# Patient Record
Sex: FEMALE | Race: WHITE | Employment: FULL TIME
[De-identification: names, ages, dates, MRNs, and addresses within clinical notes are randomized per-mention and may not be internally consistent; named-entity substitution may affect disease eponyms.]

---

## 2017-06-09 ENCOUNTER — HEALTH MAINTENANCE LETTER (OUTPATIENT)
Age: 52
End: 2017-06-09

## 2017-06-21 ENCOUNTER — TRANSFERRED RECORDS (OUTPATIENT)
Dept: FAMILY MEDICINE | Facility: CLINIC | Age: 52
End: 2017-06-21

## 2017-06-23 ENCOUNTER — TELEPHONE (OUTPATIENT)
Dept: FAMILY MEDICINE | Facility: CLINIC | Age: 52
End: 2017-06-23

## 2017-06-23 NOTE — TELEPHONE ENCOUNTER
Pt called to make sure we have received her Colonoscopy report.   She has an upcoming appointment.   JUAN FRANCISCO Agosto ([facility])

## 2017-06-27 ENCOUNTER — OFFICE VISIT (OUTPATIENT)
Dept: FAMILY MEDICINE | Facility: CLINIC | Age: 52
End: 2017-06-27

## 2017-06-27 VITALS
BODY MASS INDEX: 32.09 KG/M2 | WEIGHT: 192.6 LBS | OXYGEN SATURATION: 96 % | RESPIRATION RATE: 12 BRPM | HEIGHT: 65 IN | HEART RATE: 77 BPM | SYSTOLIC BLOOD PRESSURE: 148 MMHG | TEMPERATURE: 98.3 F | DIASTOLIC BLOOD PRESSURE: 92 MMHG

## 2017-06-27 DIAGNOSIS — I10 ESSENTIAL HYPERTENSION, BENIGN: Primary | ICD-10-CM

## 2017-06-27 PROCEDURE — 80053 COMPREHEN METABOLIC PANEL: CPT | Mod: 90 | Performed by: FAMILY MEDICINE

## 2017-06-27 PROCEDURE — 99213 OFFICE O/P EST LOW 20 MIN: CPT | Performed by: FAMILY MEDICINE

## 2017-06-27 PROCEDURE — 36415 COLL VENOUS BLD VENIPUNCTURE: CPT | Performed by: FAMILY MEDICINE

## 2017-06-27 RX ORDER — LISINOPRIL 10 MG/1
10 TABLET ORAL DAILY
Qty: 30 TABLET | Refills: 1 | Status: SHIPPED | OUTPATIENT
Start: 2017-06-27 | End: 2017-07-31

## 2017-06-27 NOTE — MR AVS SNAPSHOT
After Visit Summary   6/27/2017    Lori M Seavers    MRN: [id number]           Patient Information     Date Of Birth          1965        Visit Information        Provider Department      6/27/2017 6:15 PM Zoe Chatterjee MD [facility]        Today's Diagnoses     Essential hypertension, benign    -  1      Care Instructions    Plan:  1)  Medication: begin: lisinopril  2)  Dietary sodium restriction  3)  Regular aerobic exercise  4)  Recheck in 3 weeks, sooner should new symptoms or   problems arise.    Patient Education: Reviewed risks of hypertension and principles of   treatment.            Follow-ups after your visit        Follow-up notes from your care team     Return in about 3 weeks (around 7/18/2017).      Who to contact     If you have questions or need follow up information about today's clinic visit or your schedule please contact [facility] directly at [phone].  Normal or non-critical lab and imaging results will be communicated to you by MyChart, letter or phone within 4 business days after the clinic has received the results. If you do not hear from us within 7 days, please contact the clinic through In[employer] or phone. If you have a critical or abnormal lab result, we will notify you by phone as soon as possible.  Submit refill requests through [employer] or call your pharmacy and they will forward the refill request to us. Please allow 3 business days for your refill to be completed.          Additional Information About Your Visit        MyChart Information     [employer] gives you secure access to your electronic health record. If you see a primary care provider, you can also send messages to your care team and make appointments. If you have questions, please call your primary care clinic.  If you do not have a primary care provider, please call [phone] and they will assist you.        Care EveryWhere ID     This is your Care  "EveryWhere ID. This could be used by other organizations to access your [facility] medical records  [id number]        Your Vitals Were     Pulse Temperature Respirations Height Last Period Pulse Oximetry    77 98.3  F (36.8  C) (Oral) 12 1.657 m (5' 5.25\") 05/25/2017 96%    Breastfeeding? BMI (Body Mass Index)                No 31.81 kg/m2           Blood Pressure from Last 3 Encounters:   06/27/17 (!) 148/92   10/06/16 128/82   04/04/16 138/88    Weight from Last 3 Encounters:   06/27/17 87.4 kg (192 lb 9.6 oz)   10/06/16 86.7 kg (191 lb 3.2 oz)   04/04/16 89.8 kg (198 lb)              We Performed the Following     COMPREHENSIVE METABOLIC PANEL (QUEST) XCMP     VENOUS COLLECTION          Today's Medication Changes          These changes are accurate as of: 6/27/17  7:29 PM.  If you have any questions, ask your nurse or doctor.               Start taking these medicines.        Dose/Directions    lisinopril 10 MG tablet   Commonly known as:  PRINIVIL/ZESTRIL   Used for:  Essential hypertension, benign   Started by:  Zoe Chatterjee MD        Dose:  10 mg   Take 1 tablet (10 mg) by mouth daily   Quantity:  30 tablet   Refills:  1            Where to get your medicines      [facility] [address] [place] [address] - [address] [facility] [address] [facility]  [address], [place] [address]     Phone:  [phone]     lisinopril 10 MG tablet                Primary Care Provider Office Phone # Fax #    Zoe Chatterjee [phone]       [place] [address] [facility]  [address]  [facility] [address]        Equal Access to Services     BALTAZAR ROLAND AH: Lopez Cisse, aaron keen, qaberta kaalmada adekarthikeyan, anh emanuel. [facility] [phone].    ATENCIÓN: Si habla español, tiene a abdul disposición servicios gratuitos de asistencia lingüística. Llame al [phone].    We comply with applicable federal civil rights " laws and [place] laws. We do not discriminate on the basis of race, color, national origin, age, disability sex, sexual orientation or gender identity.            Thank you!     Thank you for choosing [facility]  for your care. Our goal is always to provide you with excellent care. Hearing back from our patients is one way we can continue to improve our services. Please take a few minutes to complete the written survey that you may receive in the mail after your visit with us. Thank you!             Your Updated Medication List - Protect others around you: Learn how to safely use, store and throw away your medicines at www.disposemymeds.org.          This list is accurate as of: 6/27/17  7:29 PM.  Always use your most recent med list.                   Brand Name Dispense Instructions for use Diagnosis    fexofenadine 60 MG tablet    ALLEGRA    180 tablet    Take 1 tablet by mouth 2 times daily.    Allergic rhinitis, cause unspecified       fluticasone 50 MCG/ACT spray    FLONASE    3 Bottle    USE ONE OR TWO SPRAYS IN EACH NOSTRIL DAILY    Non-seasonal allergic rhinitis due to pollen       lisinopril 10 MG tablet    PRINIVIL/ZESTRIL    30 tablet    Take 1 tablet (10 mg) by mouth daily    Essential hypertension, benign

## 2017-06-27 NOTE — PROGRESS NOTES
"  SUBJECTIVE:  Lori M Seavers is an 51 year old female who presents for evaluation of   hypertension. She indicates that she is feeling well and   denies any symptoms referable to her elevated blood pressure.   Specifically denies chest pain, palpitations, dyspnea, orthopnea,   PND or peripheral edema. Current medication regimen is as listed   below. Patient denies any side effects of medication.    Family history: positive for hypertension and diabetes mellitus  Age at onset of elevated blood pressure: 50  Cardiovascular risk factors: family history, hypertension, obesity, sedentary life style and stress  Use of agents associated with hypertension: none  History of renal disease: negative  History of flank trauma: negative    Current Outpatient Prescriptions   Medication     fluticasone (FLONASE) 50 MCG/ACT spray     fexofenadine (ALLEGRA) 60 MG tablet     No current facility-administered medications for this visit.      Allergies   Allergen Reactions     Amoxicillin      hives     Sulfamethoxazole W/Trimethoprim      hives       Social History   Substance Use Topics     Smoking status: Never Smoker     Smokeless tobacco: Never Used     Alcohol use 0.5 oz/week     1 Standard drinks or equivalent per week       OBJECTIVE:  BP (!) 148/92 (BP Location: Right arm, Patient Position: Chair, Cuff Size: Adult Regular)  Pulse 77  Temp 98.3  F (36.8  C) (Oral)  Resp 12  Ht 1.657 m (5' 5.25\")  Wt 87.4 kg (192 lb 9.6 oz)  LMP 05/25/2017  SpO2 96%  Breastfeeding? No  BMI 31.81 kg/m2  Repeat BP R arm seated = 148/92  with regular size cuff.  Fundi: deferred  Thyroid: normal to inspection and palpation  Lungs: negative, Percussion normal. Good diaphragmatic excursion. Lungs clear  Heart: negative, PMI normal. No lifts, heaves, or thrills. RRR. No murmurs, clicks gallops or rub  Peripheral pulses: radial=4/4, femoral=4/4, popliteal=4/4, dorsalis pedis=4/4,  Abd; The abdomen is soft without tenderness, guarding, mass or " organomegaly. Bowel sounds are normal. No CVA tenderness or inguinal adenopathy noted.  BMI : Body mass index is 31.81 kg/(m^2).    ASSESSMENT:  Essential hypertension    Plan:  1)  Medication: begin: lisinopril  2)  Dietary sodium restriction  3)  Regular aerobic exercise  4)  Recheck in 3 weeks, sooner should new symptoms or   problems arise.    Patient Education: Reviewed risks of hypertension and principles of   treatment.

## 2017-06-27 NOTE — NURSING NOTE
Mimi is here for hypertension- to discuss blood pressure.     Pre-Visit Screening :  Immunizations : up to date    Colonoscopy : is up to date  Mammogram : is up to date  Asthma Action Test/Plan : NA  PHQ9/GAD7 :  NA      Pulse - regular  My Chart - accepts    CLASSIFICATION OF OVERWEIGHT AND OBESITY BY BMI                         Obesity Class           BMI(kg/m2)  Underweight                                    < 18.5  Normal                                         18.5-24.9  Overweight                                     25.0-29.9  OBESITY                     I                  30.0-34.9                              II                 35.0-39.9  EXTREME OBESITY             III                >40                             Patient's  BMI There is no height or weight on file to calculate BMI.  http://hin.nhlbi.nih.gov/menuplanner/menu.cgi  Questioned patient about current smoking habits.  Pt. has never smoked.    JUAN FRANCISCO Agosto ([facility])

## 2017-06-28 NOTE — PATIENT INSTRUCTIONS
Plan:  1)  Medication: begin: lisinopril  2)  Dietary sodium restriction  3)  Regular aerobic exercise  4)  Recheck in 3 weeks, sooner should new symptoms or   problems arise.    Patient Education: Reviewed risks of hypertension and principles of   treatment.

## 2017-06-29 LAB
ALBUMIN SERPL-MCNC: 4.7 G/DL (ref 3.6–5.1)
ALBUMIN/GLOB SERPL: 1.6 (CALC) (ref 1–2.5)
ALP SERPL-CCNC: 74 U/L (ref 33–130)
ALT SERPL-CCNC: 43 U/L (ref 6–29)
AST SERPL-CCNC: 26 U/L (ref 10–35)
BILIRUB SERPL-MCNC: 0.3 MG/DL (ref 0.2–1.2)
BUN SERPL-MCNC: 21 MG/DL (ref 7–25)
BUN/CREATININE RATIO: ABNORMAL (CALC) (ref 6–22)
CALCIUM SERPL-MCNC: 9.4 MG/DL (ref 8.6–10.4)
CHLORIDE SERPLBLD-SCNC: 103 MMOL/L (ref 98–110)
CO2 SERPL-SCNC: 26 MMOL/L (ref 20–31)
CREAT SERPL-MCNC: 0.86 MG/DL (ref 0.5–1.05)
EGFR AFRICAN AMERICAN - QUEST: 91 ML/MIN/1.73M2
GFR SERPL CREATININE-BSD FRML MDRD: 79 ML/MIN/1.73M2
GLOBULIN, CALCULATED - QUEST: 3 G/DL (CALC) (ref 1.9–3.7)
GLUCOSE - QUEST: 108 MG/DL (ref 65–99)
POTASSIUM SERPL-SCNC: 3.9 MMOL/L (ref 3.5–5.3)
PROT SERPL-MCNC: 7.7 G/DL (ref 6.1–8.1)
SODIUM SERPL-SCNC: 138 MMOL/L (ref 135–146)

## 2017-07-03 ENCOUNTER — TRANSFERRED RECORDS (OUTPATIENT)
Dept: FAMILY MEDICINE | Facility: CLINIC | Age: 52
End: 2017-07-03

## 2017-07-10 ENCOUNTER — OFFICE VISIT (OUTPATIENT)
Dept: FAMILY MEDICINE | Facility: CLINIC | Age: 52
End: 2017-07-10

## 2017-07-10 VITALS
HEIGHT: 65 IN | DIASTOLIC BLOOD PRESSURE: 94 MMHG | TEMPERATURE: 98.4 F | OXYGEN SATURATION: 99 % | SYSTOLIC BLOOD PRESSURE: 148 MMHG | BODY MASS INDEX: 31.72 KG/M2 | WEIGHT: 190.4 LBS | HEART RATE: 115 BPM

## 2017-07-10 DIAGNOSIS — F43.21 SITUATIONAL DEPRESSION: ICD-10-CM

## 2017-07-10 DIAGNOSIS — I10 ESSENTIAL HYPERTENSION, BENIGN: Primary | ICD-10-CM

## 2017-07-10 DIAGNOSIS — F41.1 GAD (GENERALIZED ANXIETY DISORDER): ICD-10-CM

## 2017-07-10 PROBLEM — G43.109 MIGRAINE WITH AURA AND WITHOUT STATUS MIGRAINOSUS, NOT INTRACTABLE: Status: ACTIVE | Noted: 2017-07-10

## 2017-07-10 PROCEDURE — 99214 OFFICE O/P EST MOD 30 MIN: CPT | Performed by: PHYSICIAN ASSISTANT

## 2017-07-10 PROCEDURE — 84443 ASSAY THYROID STIM HORMONE: CPT | Mod: 90 | Performed by: PHYSICIAN ASSISTANT

## 2017-07-10 PROCEDURE — 36415 COLL VENOUS BLD VENIPUNCTURE: CPT | Performed by: PHYSICIAN ASSISTANT

## 2017-07-10 RX ORDER — SUMATRIPTAN 100 MG/1
100 TABLET, FILM COATED ORAL
Qty: 9 TABLET | Refills: 1 | COMMUNITY
Start: 2017-07-10 | End: 2017-10-30

## 2017-07-10 RX ORDER — LOSARTAN POTASSIUM 50 MG/1
TABLET ORAL
Qty: 90 TABLET | Refills: 0 | Status: SHIPPED | OUTPATIENT
Start: 2017-07-10 | End: 2017-10-30

## 2017-07-10 RX ORDER — FLUOXETINE 10 MG/1
10 CAPSULE ORAL DAILY
Qty: 90 CAPSULE | Refills: 0 | Status: SHIPPED | OUTPATIENT
Start: 2017-07-10 | End: 2017-07-31

## 2017-07-10 ASSESSMENT — ANXIETY QUESTIONNAIRES
3. WORRYING TOO MUCH ABOUT DIFFERENT THINGS: MORE THAN HALF THE DAYS
7. FEELING AFRAID AS IF SOMETHING AWFUL MIGHT HAPPEN: MORE THAN HALF THE DAYS
1. FEELING NERVOUS, ANXIOUS, OR ON EDGE: NEARLY EVERY DAY
GAD7 TOTAL SCORE: 16
2. NOT BEING ABLE TO STOP OR CONTROL WORRYING: MORE THAN HALF THE DAYS
5. BEING SO RESTLESS THAT IT IS HARD TO SIT STILL: MORE THAN HALF THE DAYS
6. BECOMING EASILY ANNOYED OR IRRITABLE: MORE THAN HALF THE DAYS
IF YOU CHECKED OFF ANY PROBLEMS ON THIS QUESTIONNAIRE, HOW DIFFICULT HAVE THESE PROBLEMS MADE IT FOR YOU TO DO YOUR WORK, TAKE CARE OF THINGS AT HOME, OR GET ALONG WITH OTHER PEOPLE: SOMEWHAT DIFFICULT

## 2017-07-10 ASSESSMENT — PATIENT HEALTH QUESTIONNAIRE - PHQ9: 5. POOR APPETITE OR OVEREATING: NEARLY EVERY DAY

## 2017-07-10 NOTE — PROGRESS NOTES
"SUBJECTIVE:                                                    Lori M Seavers is a 51 year old female who presents to clinic today for the following health issues:    Hypertension Follow-up - started Lisinopril on 6/27/17      Mimi is very concerned that she has not been on BP medications and just recently her PCP started her on lisinopril. She noticed a few weeks after being on the medication some pressure in the neck, feeling of tightness in throat, feeling \"hot\"      Was on vacation in [place]    Felt \"jolt\" in her chest duringsleep Sunday into Monday last week, no heart racing or pain  Seen in   EKG was the Monday after that.   EKG was normal - see scanned doc    Strong family history of HTN - family members don't tolerate lisinopril well historically    Mom on losartan 50mg  Father - lisinopril  Brother Marv - losartan, amlodipine  Brother - was on lisinopril - switched to natural           The 10-year ASCVD risk score (Randymoreno GARRIDO Jr, et al., 2013) is: 3.1%    Values used to calculate the score:      Age: 51 years      Sex: Female      Is Non- : No      Diabetic: No      Tobacco smoker: No      Systolic Blood Pressure: 148 mmHg      Is BP treated: Yes      HDL Cholesterol: 51 mg/dL      Total Cholesterol: 241 mg/dL          BP Readings from Last 6 Encounters:   07/10/17 (!) 148/94   06/27/17 (!) 148/92   10/06/16 128/82   04/04/16 138/88   12/22/15 138/86   09/24/15 (!) 150/92         Other concerns to address:  Is having some situational anxiety/depression.  Daughter moved, mother passed    Jessica Gillespie therapist - 1-2 x a month.  On Prozac in 2011  Contemplating restarting medication    Denies SI      ROS:  Constitutional, HEENT, cardiovascular, pulmonary, GI and  systems are negative, except as otherwise noted.    Problem list, Medication list, Allergies, and Medical/Social/Surgical histories reviewed in [facility] and updated as appropriate.  Labs reviewed in EPIC  BP Readings from Last 3 " Encounters:   07/10/17 (!) 148/94   06/27/17 (!) 148/92   10/06/16 128/82    Wt Readings from Last 3 Encounters:   07/10/17 86.4 kg (190 lb 6.4 oz)   06/27/17 87.4 kg (192 lb 9.6 oz)   10/06/16 86.7 kg (191 lb 3.2 oz)                  Patient Active Problem List   Diagnosis     Allergic rhinitis due to pollen     Musculoskeletal disorder and symptoms referable to neck     Obesity     Diffuse cystic mastopathy     Health Care Home     ACP (advance care planning)     Essential hypertension, benign     Migraine with aura and without status migrainosus, not intractable     Past Surgical History:   Procedure Laterality Date     HC REDUCTION OF LARGE BREAST  6/03    bilateral       Social History   Substance Use Topics     Smoking status: Never Smoker     Smokeless tobacco: Never Used     Alcohol use 0.5 oz/week     1 Standard drinks or equivalent per week     Family History   Problem Relation Age of Onset     Allergies Brother      Hypertension Brother      DIABETES Father      insulin daily, age 52     Hypertension Father      Dementia Father      Hypertension Brother      Hypertension Mother      Hypertension Maternal Grandmother      Coronary Artery Disease Maternal Grandfather 58     MI     Hypertension Maternal Grandfather      Other - See Comments Paternal Grandmother      MVA     Coronary Artery Disease Paternal Grandfather      MI in his 70s.          Current Outpatient Prescriptions   Medication Sig Dispense Refill     losartan (COZAAR) 50 MG tablet Take 1/2 tab for 1 week then 1 tab daily 90 tablet 0     FLUoxetine (PROZAC) 10 MG capsule Take 1 capsule (10 mg) by mouth daily 90 capsule 0     fluticasone (FLONASE) 50 MCG/ACT spray USE ONE OR TWO SPRAYS IN EACH NOSTRIL DAILY 3 Bottle 3     fexofenadine (ALLEGRA) 60 MG tablet Take 1 tablet by mouth 2 times daily. 180 tablet 3     lisinopril (PRINIVIL/ZESTRIL) 10 MG tablet Take 1 tablet (10 mg) by mouth daily (Patient not taking: Reported on 7/10/2017) 30 tablet 1  "    Allergies   Allergen Reactions     Amoxicillin      hives     Lisinopril      Sulfamethoxazole W/Trimethoprim      hives     Recent Labs   Lab Test  06/28/17   0729  10/06/16   1033 03/12/15  10/28/14   1927  10/10/12   0944   LDL   --   162*  154*   --   138*   HDL   --   51  47   --   48   TRIG   --   140  197*   --   154*   ALT  43*   --   120*   --    --    CR  0.86   --   0.83   --    --    GFRESTIMATED  79   --    --    --    --    POTASSIUM  3.9   --   4.1   --    --    TSH   --    --    --   2.85   --         OBJECTIVE:                                                    BP (!) 148/94 (BP Location: Right arm, Patient Position: Chair, Cuff Size: Adult Regular)  Pulse 115  Temp 98.4  F (36.9  C) (Oral)  Ht 1.657 m (5' 5.25\")  Wt 86.4 kg (190 lb 6.4 oz)  LMP 05/25/2017  SpO2 99%  Breastfeeding? No  BMI 31.44 kg/m2   Body mass index is 31.44 kg/(m^2).   GENERAL:  alert, well nourished, well hydrated, no distress  Head: Normocephalic, atraumatic.  Eyes: Conjunctiva clear, no discharge  Ears: External ears and TMs normal BL.  Nose: Nasal mucosa pink and moist. No discharge.  Mouth / Throat: Mucous membranes moist.  Pharynx non-erythematous, no exudates.   Neck: Supple, No thyromegaly or nodules. No lymphadenopathy.  Cv: regular rate and rhythm, normal s1 and s2 without murmur or click  Lungs: clear to auscultation, no wheezing, rhonchi, or crackles      Mental Status Exam:   Appearance: anxious and overwhelmed  Grooming: adequately groomed  Demeanor: engaged, cooperative  Affect: alert  Speech: Normal.  Gait:Normal.  Movements: Normal  Form of thought: Logical, Linear and Goal directed  Thought content:  Normal  Insight:Good   Judgment: Good   Cognition: Good          ASSESSMENT/PLAN:                                                    1. BIMAL (generalized anxiety disorder)  - FLUoxetine (PROZAC) 10 MG capsule; Take 1 capsule (10 mg) by mouth daily  Dispense: 90 capsule; Refill: 0  - TSH with free T4 " reflex  - VENOUS COLLECTION    Will start Prozac 1-2 weeks if no AE with losartan and anxiety still high    2. Essential hypertension, benign  - losartan (COZAAR) 50 MG tablet; Take 1/2 tab for 1 week then 1 tab daily  Dispense: 90 tablet; Refill: 0  I reviewed the patient information handout from Up To Date on this medication including side effects with the patient.  Record BP 2-3 x a week at home    3. Situational depression     30 min spent with pt      Patsy Bowman PA-C

## 2017-07-10 NOTE — NURSING NOTE
Mimi is here for hypertension.-  was traveling and ended up going to the . Her heart was racking and she felt pressure in her neck. She also stated that she had been having problems of sleeping.  At , they did an EKG, checked her blood pressure and it was high, gave her a prescription for sleep and then sent her on her way.. Pt did stop taking the night medication and the lisinopril.     Pre-Visit Screening :  Immunizations : up to date    Colonoscopy : is up to date  Mammogram : is up to date  Asthma Action Test/Plan : Na  PHQ9/GAD7 :  na  Pulse - regular  My Chart - accepts    CLASSIFICATION OF OVERWEIGHT AND OBESITY BY BMI                         Obesity Class           BMI(kg/m2)  Underweight                                    < 18.5  Normal                                         18.5-24.9  Overweight                                     25.0-29.9  OBESITY                     I                  30.0-34.9                              II                 35.0-39.9  EXTREME OBESITY             III                >40                             Patient's  BMI Body mass index is 31.44 kg/(m^2).  http://hin.nhlbi.nih.gov/menuplanner/menu.cgi  Questioned patient about current smoking habits.  Pt. has never smoked.    Roxana.JUAN FRANCISCO Becerra ([facility])

## 2017-07-10 NOTE — MR AVS SNAPSHOT
"              After Visit Summary   7/10/2017    Lori M Seavers    MRN: [id number]           Patient Information     Date Of Birth          1965        Visit Information        Provider Department      7/10/2017 2:15 PM Patsy Bowman PA [facility]        Today's Diagnoses     Essential hypertension, benign    -  1    BIMAL (generalized anxiety disorder)        Situational depression           Follow-ups after your visit        Who to contact     If you have questions or need follow up information about today's clinic visit or your schedule please contact [facility] directly at [phone].  Normal or non-critical lab and imaging results will be communicated to you by [employer], letter or phone within 4 business days after the clinic has received the results. If you do not hear from us within 7 days, please contact the clinic through [employer] or phone. If you have a critical or abnormal lab result, we will notify you by phone as soon as possible.  Submit refill requests through [employer] or call your pharmacy and they will forward the refill request to us. Please allow 3 business days for your refill to be completed.          Additional Information About Your Visit        MyChart Information     [employer] gives you secure access to your electronic health record. If you see a primary care provider, you can also send messages to your care team and make appointments. If you have questions, please call your primary care clinic.  If you do not have a primary care provider, please call [phone] and they will assist you.        Care EveryWhere ID     This is your Care EveryWhere ID. This could be used by other organizations to access your [facility] medical records  [id number]        Your Vitals Were     Pulse Temperature Height Last Period Pulse Oximetry Breastfeeding?    115 98.4  F (36.9  C) (Oral) 1.657 m (5' 5.25\") 05/25/2017 99% No    BMI (Body Mass Index) "                   31.44 kg/m2            Blood Pressure from Last 3 Encounters:   07/10/17 (!) 148/94   06/27/17 (!) 148/92   10/06/16 128/82    Weight from Last 3 Encounters:   07/10/17 86.4 kg (190 lb 6.4 oz)   06/27/17 87.4 kg (192 lb 9.6 oz)   10/06/16 86.7 kg (191 lb 3.2 oz)              We Performed the Following     TSH with free T4 reflex     VENOUS COLLECTION          Today's Medication Changes          These changes are accurate as of: 7/10/17 11:59 PM.  If you have any questions, ask your nurse or doctor.               Start taking these medicines.        Dose/Directions    FLUoxetine 10 MG capsule   Commonly known as:  PROzac   Used for:  BIMAL (generalized anxiety disorder)   Started by:  Patsy Bowman PA        Dose:  10 mg   Take 1 capsule (10 mg) by mouth daily   Quantity:  90 capsule   Refills:  0       losartan 50 MG tablet   Commonly known as:  COZAAR   Used for:  Essential hypertension, benign   Started by:  Patsy Bowman PA        Take 1/2 tab for 1 week then 1 tab daily   Quantity:  90 tablet   Refills:  0            Where to get your medicines      These medications were sent to [facility] - [place], [place] - [address] [facility]  [address] [place] [address] [place] [address]     Phone:  [phone]     losartan 50 MG tablet         Some of these will need a paper prescription and others can be bought over the counter.  Ask your nurse if you have questions.     Bring a paper prescription for each of these medications     FLUoxetine 10 MG capsule                Primary Care Provider Office Phone # Fax #    Zoe Chatterjee [phone]       [place] [address]  [address]  [facility] [address]        Equal Access to Services     BALTAZAR ROLAND AH: Lopez Cisse, waricardada luqadaha, qaybta kaalmada ellen, anh emanuel. [facility] [phone].    ATENCIÓN: Ned mo,  tiene a abdul disposición servicios gratuitos de asistencia lingüística. Kelly stovall [phone].    We comply with applicable federal civil rights laws and [place] laws. We do not discriminate on the basis of race, color, national origin, age, disability sex, sexual orientation or gender identity.            Thank you!     Thank you for choosing [facility]  for your care. Our goal is always to provide you with excellent care. Hearing back from our patients is one way we can continue to improve our services. Please take a few minutes to complete the written survey that you may receive in the mail after your visit with us. Thank you!             Your Updated Medication List - Protect others around you: Learn how to safely use, store and throw away your medicines at www.disposemymeds.org.          This list is accurate as of: 7/10/17 11:59 PM.  Always use your most recent med list.                   Brand Name Dispense Instructions for use Diagnosis    fexofenadine 60 MG tablet    ALLEGRA    180 tablet    Take 1 tablet by mouth 2 times daily.    Allergic rhinitis, cause unspecified       FLUoxetine 10 MG capsule    PROzac    90 capsule    Take 1 capsule (10 mg) by mouth daily    BIMAL (generalized anxiety disorder)       fluticasone 50 MCG/ACT spray    FLONASE    3 Bottle    USE ONE OR TWO SPRAYS IN EACH NOSTRIL DAILY    Non-seasonal allergic rhinitis due to pollen       IMITREX 100 MG tablet   Generic drug:  SUMAtriptan     9 tablet    Take 1 tablet (100 mg) by mouth at onset of headache for migraine May repeat in 2 hours. Max 2 tablets/24 hours.        lisinopril 10 MG tablet    PRINIVIL/ZESTRIL    30 tablet    Take 1 tablet (10 mg) by mouth daily    Essential hypertension, benign       losartan 50 MG tablet    COZAAR    90 tablet    Take 1/2 tab for 1 week then 1 tab daily    Essential hypertension, benign

## 2017-07-11 LAB — TSH SERPL-ACNC: 1.16 MIU/L

## 2017-07-11 ASSESSMENT — ANXIETY QUESTIONNAIRES: GAD7 TOTAL SCORE: 16

## 2017-07-11 ASSESSMENT — PATIENT HEALTH QUESTIONNAIRE - PHQ9: SUM OF ALL RESPONSES TO PHQ QUESTIONS 1-9: 8

## 2017-07-31 ENCOUNTER — OFFICE VISIT (OUTPATIENT)
Dept: FAMILY MEDICINE | Facility: CLINIC | Age: 52
End: 2017-07-31

## 2017-07-31 VITALS
TEMPERATURE: 98.2 F | DIASTOLIC BLOOD PRESSURE: 92 MMHG | BODY MASS INDEX: 31.62 KG/M2 | OXYGEN SATURATION: 99 % | SYSTOLIC BLOOD PRESSURE: 132 MMHG | HEIGHT: 65 IN | HEART RATE: 81 BPM | WEIGHT: 189.8 LBS

## 2017-07-31 DIAGNOSIS — I10 ESSENTIAL HYPERTENSION, BENIGN: ICD-10-CM

## 2017-07-31 DIAGNOSIS — F43.21 SITUATIONAL DEPRESSION: ICD-10-CM

## 2017-07-31 DIAGNOSIS — F41.1 GAD (GENERALIZED ANXIETY DISORDER): Primary | ICD-10-CM

## 2017-07-31 PROCEDURE — 99213 OFFICE O/P EST LOW 20 MIN: CPT | Performed by: PHYSICIAN ASSISTANT

## 2017-07-31 PROCEDURE — 36415 COLL VENOUS BLD VENIPUNCTURE: CPT | Performed by: PHYSICIAN ASSISTANT

## 2017-07-31 PROCEDURE — 80048 BASIC METABOLIC PNL TOTAL CA: CPT | Mod: 90 | Performed by: PHYSICIAN ASSISTANT

## 2017-07-31 RX ORDER — FLUOXETINE 10 MG/1
10 CAPSULE ORAL DAILY
COMMUNITY
End: 2017-09-18

## 2017-07-31 NOTE — MR AVS SNAPSHOT
"              After Visit Summary   7/31/2017    Lori M Seavers    MRN: [id number]           Patient Information     Date Of Birth          1965        Visit Information        Provider Department      7/31/2017 5:45 PM Patsy Bowman PA [facility]        Today's Diagnoses     BIMAL (generalized anxiety disorder)    -  1    Situational depression        Essential hypertension, benign           Follow-ups after your visit        Who to contact     If you have questions or need follow up information about today's clinic visit or your schedule please contact [facility] directly at [phone].  Normal or non-critical lab and imaging results will be communicated to you by moduhart, letter or phone within 4 business days after the clinic has received the results. If you do not hear from us within 7 days, please contact the clinic through moduhart or phone. If you have a critical or abnormal lab result, we will notify you by phone as soon as possible.  Submit refill requests through FileThis or call your pharmacy and they will forward the refill request to us. Please allow 3 business days for your refill to be completed.          Additional Information About Your Visit        MyChart Information     FileThis gives you secure access to your electronic health record. If you see a primary care provider, you can also send messages to your care team and make appointments. If you have questions, please call your primary care clinic.  If you do not have a primary care provider, please call [phone] and they will assist you.        Care EveryWhere ID     This is your Care EveryWhere ID. This could be used by other organizations to access your [place] medical records  [id number]        Your Vitals Were     Pulse Temperature Height Pulse Oximetry Breastfeeding? BMI (Body Mass Index)    81 98.2  F (36.8  C) (Oral) 1.657 m (5' 5.25\") 99% No 31.34 kg/m2       Blood " Pressure from Last 3 Encounters:   07/31/17 (!) 132/92   07/10/17 (!) 148/94   06/27/17 (!) 148/92    Weight from Last 3 Encounters:   07/31/17 86.1 kg (189 lb 12.8 oz)   07/10/17 86.4 kg (190 lb 6.4 oz)   06/27/17 87.4 kg (192 lb 9.6 oz)              We Performed the Following     BASIC METABOLIC PANEL ([facility])     VENOUS COLLECTION        Primary Care Provider Office Phone # Fax #    Zoe Chatterjee [phone] [address] [address]        Equal Access to Services     PRERNA ROLAND : Hadii yahir santiagoo Sodebbie, waaxda luqbiju, qaybta kaalmada ademichaelyabeverley, anh ivey . So [facility] [phone].    ATENCIÓN: Si habla español, tiene a abdul disposición servicios gratuitos de asistencia lingüística. Llame al [phone].    We comply with applicable federal civil rights laws and [place] laws. We do not discriminate on the basis of race, color, national origin, age, disability sex, sexual orientation or gender identity.            Thank you!     Thank you for choosing [facility]  for your care. Our goal is always to provide you with excellent care. Hearing back from our patients is one way we can continue to improve our services. Please take a few minutes to complete the written survey that you may receive in the mail after your visit with us. Thank you!             Your Updated Medication List - Protect others around you: Learn how to safely use, store and throw away your medicines at www.disposemymeds.org.          This list is accurate as of: 7/31/17 11:59 PM.  Always use your most recent med list.                   Brand Name Dispense Instructions for use Diagnosis    fexofenadine 60 MG tablet    ALLEGRA    180 tablet    Take 1 tablet by mouth 2 times daily.    Allergic rhinitis, cause unspecified       fluticasone 50 MCG/ACT spray    FLONASE    3 Bottle    USE ONE OR TWO SPRAYS IN EACH NOSTRIL DAILY     Non-seasonal allergic rhinitis due to pollen       IMITREX 100 MG tablet   Generic drug:  SUMAtriptan     9 tablet    Take 1 tablet (100 mg) by mouth at onset of headache for migraine May repeat in 2 hours. Max 2 tablets/24 hours.        losartan 50 MG tablet    COZAAR    90 tablet    Take 1/2 tab for 1 week then 1 tab daily    Essential hypertension, benign       PROZAC 10 MG capsule   Generic drug:  FLUoxetine      Take 10 mg by mouth daily

## 2017-07-31 NOTE — PROGRESS NOTES
SUBJECTIVE:                                                    Lori M Seavers is a 51 year old female who presents to clinic today for the following health issues:      Hypertension Follow-up      Outpatient blood pressures are being checked at home.  Results are 110s/90s.    Low Salt Diet: no added salt    BP Readings from Last 6 Encounters:   17 (!) 132/92   07/10/17 (!) 148/94   17 (!) 148/92   10/06/16 128/82   16 138/88   12/22/15 138/86         Depression and Anxiety Follow-Up    Status since last visit: Improved     Other associated symptoms:None    Complicating factors:     Significant life event:daughter moved, mother      Current substance abuse: None      Initially pressure in neck and into face earlier and this month.  Still having some sx but improved  No chest pain with exertion since I saw her    + occ lightheadedness, fogginess for a couple of years.  Sensation of detachment, better with food and movement      The 10-year ASCVD risk score (Randymoreno GARRIDO Jr, et al., 2013) is: 2.5%    Values used to calculate the score:      Age: 51 years      Sex: Female      Is Non- : No      Diabetic: No      Tobacco smoker: No      Systolic Blood Pressure: 132 mmHg      Is BP treated: Yes      HDL Cholesterol: 51 mg/dL      Total Cholesterol: 241 mg/dL          PHQ-9 SCORE 2011 10/28/2014 7/10/2017   Total Score 7 12 -   Total Score - - 8     BIMAL-7 SCORE 10/28/2014 7/10/2017   Total Score 16 -   Total Score - 16       PHQ-9  English  PHQ-9   Any Language  GAD7      ROS:   C: NEGATIVE for fever, chills, change in weight  E: NEGATIVE for vision changes or irritation  E/M: NEGATIVE for ear, mouth and throat problems  R: NEGATIVE for significant cough or SOB  CV: NEGATIVE for chest pain, palpitations or peripheral edema  GI: NEGATIVE for nausea, abdominal pain, heartburn, or change in bowel habits  : NEGATIVE for frequency, dysuria, or hematuria        Labs reviewed in  EPIC  BP Readings from Last 3 Encounters:   07/31/17 (!) 132/92   07/10/17 (!) 148/94   06/27/17 (!) 148/92    Wt Readings from Last 3 Encounters:   07/31/17 86.1 kg (189 lb 12.8 oz)   07/10/17 86.4 kg (190 lb 6.4 oz)   06/27/17 87.4 kg (192 lb 9.6 oz)                  Patient Active Problem List   Diagnosis     Allergic rhinitis due to pollen     Musculoskeletal disorder and symptoms referable to neck     Obesity     Diffuse cystic mastopathy     Health Care Home     ACP (advance care planning)     Essential hypertension, benign     Migraine with aura and without status migrainosus, not intractable     Situational depression     BIMAL (generalized anxiety disorder)     Past Surgical History:   Procedure Laterality Date     HC REDUCTION OF LARGE BREAST  6/03    bilateral       Social History   Substance Use Topics     Smoking status: Never Smoker     Smokeless tobacco: Never Used     Alcohol use 0.5 oz/week     1 Standard drinks or equivalent per week     Family History   Problem Relation Age of Onset     Allergies Brother      Hypertension Brother      DIABETES Father      insulin daily, age 52     Hypertension Father      Dementia Father      Hypertension Brother      Hypertension Mother      Hypertension Maternal Grandmother      Coronary Artery Disease Maternal Grandfather 58     MI     Hypertension Maternal Grandfather      Other - See Comments Paternal Grandmother      MVA     Coronary Artery Disease Paternal Grandfather      MI in his 70s.      Coronary Artery Disease Paternal Aunt 78         Current Outpatient Prescriptions   Medication Sig Dispense Refill     FLUoxetine (PROZAC) 10 MG capsule Take 10 mg by mouth daily       fexofenadine (ALLEGRA) 60 MG tablet Take 1 tablet by mouth 2 times daily. 180 tablet 3     losartan (COZAAR) 50 MG tablet Take 1/2 tab for 1 week then 1 tab daily 90 tablet 0     SUMAtriptan (IMITREX) 100 MG tablet Take 1 tablet (100 mg) by mouth at onset of headache for migraine May  "repeat in 2 hours. Max 2 tablets/24 hours. 9 tablet 1     fluticasone (FLONASE) 50 MCG/ACT spray USE ONE OR TWO SPRAYS IN EACH NOSTRIL DAILY 3 Bottle 3     Allergies   Allergen Reactions     Amoxicillin      hives     Lisinopril      Sulfamethoxazole W/Trimethoprim      hives     Recent Labs   Lab Test  07/31/17   1859  07/10/17   1548  06/28/17   0729  10/06/16   1033 03/12/15   10/28/14   1927  10/10/12   0944   LDL   --    --    --   162*  154*   --    --   138*   HDL   --    --    --   51  47   --    --   48   TRIG   --    --    --   140  197*   --    --   154*   ALT   --    --   43*   --   120*   --    --    --    CR  0.82   --   0.86   --   0.83   < >   --    --    GFRESTIMATED  83   --   79   --    --    --    --    --    POTASSIUM  3.9   --   3.9   --   4.1   < >   --    --    TSH   --   1.16   --    --    --    --   2.85   --     < > = values in this interval not displayed.              OBJECTIVE:   BP (!) 132/92 (BP Location: Left arm, Patient Position: Chair, Cuff Size: Adult Regular)  Pulse 81  Temp 98.2  F (36.8  C) (Oral)  Ht 1.657 m (5' 5.25\")  Wt 86.1 kg (189 lb 12.8 oz)  SpO2 99%  Breastfeeding? No  BMI 31.34 kg/m2   Body mass index is 31.34 kg/(m^2).       GENERAL: healthy, alert and no distress  HEAD: Normocephalic, atraumatic  EYES: Eyes grossly normal to inspection, extraocular movements - intact in all directions. No discharge  EARS: canals- normal; TMs- normal  NOSE:  Nasal mucosa pink and moist. No abnormal discharge.  MOUTH:   Mucous membranes moist.  Pharynx non-erythematous, no exudates. No ulcers, no lesions  NECK: no tenderness, no adenopathy,  no masses, no stiffness; thyroid- normal to palpation  RESP: lungs clear to auscultation - no rales, no rhonchi, no wheezes  CV: regular rates and rhythm, normal S1 S2, no S3 or S4 and no murmur, no click or rub   MS: extremities- no gross deformities noted  NEURO: strength and tone- normal, sensory exam- grossly normal, mentation- intact, " speech- normal      Mental Status Exam:   Appearance: calm  Grooming: adequately groomed  Demeanor: engaged, cooperative  Affect: normal  Speech: Normal.  Gait:Normal.  Movements: Normal  Form of thought: Logical, Linear and Goal directed  Thought content:  Normal  Insight:Good   Judgment: Good   Cognition: Good           ASSESSMENT/PLAN:                                                    1. BIMAL (generalized anxiety disorder)    2. Situational depression  Start prozac  10 mg  2 weeks inc to 20 mg    3. Essential hypertension, benign  Cont. Current dose.         Follow Up: 4 weeks in clinic      [facility]

## 2017-07-31 NOTE — NURSING NOTE
Mimi is here a hypertension  Recheck.       Pre-Visit Screening :  Immunizations : up to date    Colonoscopy : NA  Mammogram : is up to date  Asthma Action Test/Plan : NA  PHQ9/GAD7 :  NA    Pulse - regular  My Chart - accepts    CLASSIFICATION OF OVERWEIGHT AND OBESITY BY BMI                         Obesity Class           BMI(kg/m2)  Underweight                                    < 18.5  Normal                                         18.5-24.9  Overweight                                     25.0-29.9  OBESITY                     I                  30.0-34.9                              II                 35.0-39.9  EXTREME OBESITY             III                >40                             Patient's  BMI Body mass index is 31.34 kg/(m^2).  http://hin.nhlbi.nih.gov/menuplanner/menu.cgi  Questioned patient about current smoking habits.  Pt. has never smoked.    Roxana.JUAN FRANCISCO Becerra ([facility])

## 2017-08-02 LAB
BUN SERPL-MCNC: 22 MG/DL (ref 7–25)
BUN/CREATININE RATIO: ABNORMAL (CALC) (ref 6–22)
CALCIUM SERPL-MCNC: 9.7 MG/DL (ref 8.6–10.4)
CHLORIDE SERPLBLD-SCNC: 105 MMOL/L (ref 98–110)
CO2 SERPL-SCNC: 26 MMOL/L (ref 20–31)
CREAT SERPL-MCNC: 0.82 MG/DL (ref 0.5–1.05)
EGFR AFRICAN AMERICAN - QUEST: 97 ML/MIN/1.73M2
GFR SERPL CREATININE-BSD FRML MDRD: 83 ML/MIN/1.73M2
GLUCOSE - QUEST: 108 MG/DL (ref 65–99)
POTASSIUM SERPL-SCNC: 3.9 MMOL/L (ref 3.5–5.3)
SODIUM SERPL-SCNC: 139 MMOL/L (ref 135–146)

## 2017-09-18 DIAGNOSIS — F41.1 GAD (GENERALIZED ANXIETY DISORDER): Primary | ICD-10-CM

## 2017-09-18 RX ORDER — FLUOXETINE 10 MG/1
CAPSULE ORAL
Qty: 90 CAPSULE | Refills: 0 | Status: SHIPPED | OUTPATIENT
Start: 2017-09-18 | End: 2017-09-19

## 2017-09-18 NOTE — TELEPHONE ENCOUNTER
Pending Prescriptions:                       Disp   Refills    FLUoxetine (PROZAC) 10 MG capsule [Pharma*90 cap*0            Sig: TAKE ONE CAPSULE BY MOUTH DAILY    CER please review:    This medication is in historical   Per notes ( 7-) pt to start it .  Follow up in 4 weeks.  Pt has an appt to see LES on 9-  Please fax, deny or close encounter.  Jeremiah  [phone] (home) [phone] (work)

## 2017-09-18 NOTE — TELEPHONE ENCOUNTER
90 days sent  Recheck with Dr. Chatterjee on her anxiety at end of the month    Patsy Bowman PA-C  9/18/2017

## 2017-09-19 NOTE — TELEPHONE ENCOUNTER
Called 20 mg to [facility] pharmacy per CER note.  This should've been changed to the correct dosage before refill sent to pharmacy.  It is noted in chart per CER that she increased the dose from 10 mg to 20 mg so the patient was taking 2 pills daily.  She ran out of meds, but insurance will not fill 10 mg as it will show fill too soon.  Called correct dosage to [facility]'s so med can be filled and pt has appt with LES scheduled

## 2017-10-30 ENCOUNTER — OFFICE VISIT (OUTPATIENT)
Dept: FAMILY MEDICINE | Facility: CLINIC | Age: 52
End: 2017-10-30

## 2017-10-30 VITALS
TEMPERATURE: 98.2 F | DIASTOLIC BLOOD PRESSURE: 80 MMHG | SYSTOLIC BLOOD PRESSURE: 120 MMHG | OXYGEN SATURATION: 98 % | HEIGHT: 65 IN | BODY MASS INDEX: 31.32 KG/M2 | HEART RATE: 74 BPM | WEIGHT: 188 LBS | RESPIRATION RATE: 16 BRPM

## 2017-10-30 DIAGNOSIS — E66.811 CLASS 1 OBESITY DUE TO EXCESS CALORIES IN ADULT, UNSPECIFIED BMI, UNSPECIFIED WHETHER SERIOUS COMORBIDITY PRESENT: ICD-10-CM

## 2017-10-30 DIAGNOSIS — I10 ESSENTIAL HYPERTENSION, BENIGN: ICD-10-CM

## 2017-10-30 DIAGNOSIS — Z13.220 SCREENING CHOLESTEROL LEVEL: ICD-10-CM

## 2017-10-30 DIAGNOSIS — Z12.31 ENCOUNTER FOR SCREENING MAMMOGRAM FOR BREAST CANCER: ICD-10-CM

## 2017-10-30 DIAGNOSIS — G43.109 MIGRAINE WITH AURA AND WITHOUT STATUS MIGRAINOSUS, NOT INTRACTABLE: ICD-10-CM

## 2017-10-30 DIAGNOSIS — M22.42 CHONDROMALACIA OF PATELLA, LEFT: ICD-10-CM

## 2017-10-30 DIAGNOSIS — J30.1 CHRONIC SEASONAL ALLERGIC RHINITIS DUE TO POLLEN: ICD-10-CM

## 2017-10-30 DIAGNOSIS — F33.42 MAJOR DEPRESSIVE DISORDER, RECURRENT EPISODE, IN FULL REMISSION (H): ICD-10-CM

## 2017-10-30 DIAGNOSIS — Z01.411 ENCOUNTER FOR GYNECOLOGICAL EXAMINATION WITH ABNORMAL FINDING: Primary | ICD-10-CM

## 2017-10-30 DIAGNOSIS — E66.09 CLASS 1 OBESITY DUE TO EXCESS CALORIES IN ADULT, UNSPECIFIED BMI, UNSPECIFIED WHETHER SERIOUS COMORBIDITY PRESENT: ICD-10-CM

## 2017-10-30 DIAGNOSIS — Z13.1 SCREENING FOR DIABETES MELLITUS: ICD-10-CM

## 2017-10-30 LAB
% GRANULOCYTES: 70 %
HCT VFR BLD AUTO: 42.9 % (ref 35–47)
HEMOGLOBIN: 14 G/DL (ref 11.7–15.7)
LYMPHOCYTES NFR BLD AUTO: 21.7 %
MCH RBC QN AUTO: 30 PG (ref 26–33)
MCHC RBC AUTO-ENTMCNC: 32.6 G/DL (ref 31–36)
MCV RBC AUTO: 91.8 FL (ref 78–100)
MONOCYTES NFR BLD AUTO: 8.2 %
PLATELET COUNT - QUEST: 373 10^9/L (ref 150–375)
RBC # BLD AUTO: 4.67 10*12/L (ref 3.8–5.2)
WBC # BLD AUTO: 5.9 10*9/L (ref 4–11)

## 2017-10-30 PROCEDURE — 36415 COLL VENOUS BLD VENIPUNCTURE: CPT | Performed by: FAMILY MEDICINE

## 2017-10-30 PROCEDURE — 85025 COMPLETE CBC W/AUTO DIFF WBC: CPT | Performed by: FAMILY MEDICINE

## 2017-10-30 PROCEDURE — 99396 PREV VISIT EST AGE 40-64: CPT | Performed by: FAMILY MEDICINE

## 2017-10-30 PROCEDURE — 88142 CYTOPATH C/V THIN LAYER: CPT | Mod: 90 | Performed by: FAMILY MEDICINE

## 2017-10-30 PROCEDURE — 99213 OFFICE O/P EST LOW 20 MIN: CPT | Mod: 25 | Performed by: FAMILY MEDICINE

## 2017-10-30 PROCEDURE — 80061 LIPID PANEL: CPT | Mod: 90 | Performed by: FAMILY MEDICINE

## 2017-10-30 PROCEDURE — 80053 COMPREHEN METABOLIC PANEL: CPT | Mod: 90 | Performed by: FAMILY MEDICINE

## 2017-10-30 RX ORDER — SUMATRIPTAN 100 MG/1
100 TABLET, FILM COATED ORAL
Qty: 9 TABLET | Refills: 1 | Status: SHIPPED | OUTPATIENT
Start: 2017-10-30 | End: 2018-09-12

## 2017-10-30 RX ORDER — FEXOFENADINE HCL 60 MG/1
60 TABLET, FILM COATED ORAL 2 TIMES DAILY
Qty: 180 TABLET | Refills: 3 | Status: SHIPPED | OUTPATIENT
Start: 2017-10-30

## 2017-10-30 RX ORDER — LOSARTAN POTASSIUM 50 MG/1
25 TABLET ORAL DAILY
Qty: 45 TABLET | Refills: 3 | Status: SHIPPED | OUTPATIENT
Start: 2017-10-30

## 2017-10-30 RX ORDER — FLUTICASONE PROPIONATE 50 MCG
2 SPRAY, SUSPENSION (ML) NASAL DAILY
Qty: 3 BOTTLE | Refills: 3 | Status: SHIPPED | OUTPATIENT
Start: 2017-10-30

## 2017-10-30 ASSESSMENT — ANXIETY QUESTIONNAIRES
1. FEELING NERVOUS, ANXIOUS, OR ON EDGE: SEVERAL DAYS
5. BEING SO RESTLESS THAT IT IS HARD TO SIT STILL: NOT AT ALL
3. WORRYING TOO MUCH ABOUT DIFFERENT THINGS: NOT AT ALL
2. NOT BEING ABLE TO STOP OR CONTROL WORRYING: SEVERAL DAYS
IF YOU CHECKED OFF ANY PROBLEMS ON THIS QUESTIONNAIRE, HOW DIFFICULT HAVE THESE PROBLEMS MADE IT FOR YOU TO DO YOUR WORK, TAKE CARE OF THINGS AT HOME, OR GET ALONG WITH OTHER PEOPLE: NOT DIFFICULT AT ALL
7. FEELING AFRAID AS IF SOMETHING AWFUL MIGHT HAPPEN: NOT AT ALL
6. BECOMING EASILY ANNOYED OR IRRITABLE: NOT AT ALL
GAD7 TOTAL SCORE: 3

## 2017-10-30 ASSESSMENT — PATIENT HEALTH QUESTIONNAIRE - PHQ9: 5. POOR APPETITE OR OVEREATING: SEVERAL DAYS

## 2017-10-30 NOTE — PATIENT INSTRUCTIONS
Total calcium intake of 1500 mgm/day, vitamin D 400-800IU/day and a regular weight bearing exercise program for prevention of osteoporosis is recommended treatment at this time.    1)  Medication: continue current medication regimen unchanged  2)  Dietary sodium restriction  3)  Regular aerobic exercise and weight loss  4)  Recheck in 1 year, sooner should new symptoms or   problems arise.    Patient Education: Reviewed risks of hypertension and principles of   Treatment.    Rehab stretches/ exercises to begin immediately and given in writing with illustrations.  Monitor knee

## 2017-10-30 NOTE — MR AVS SNAPSHOT
After Visit Summary   10/30/2017    Lori M Seavers    MRN: [id number]           Patient Information     Date Of Birth          1965        Visit Information        Provider Department      10/30/2017 8:30 AM Zoe Chatterjee MD [facility]        Today's Diagnoses     Encounter for gynecological examination with abnormal finding    -  1    Essential hypertension, benign        Chronic seasonal allergic rhinitis due to pollen        Migraine with aura and without status migrainosus, not intractable        Major depressive disorder, recurrent episode, in full remission (H)        Chondromalacia of patella, left        Class 1 obesity due to excess calories in adult, unspecified BMI, unspecified whether serious comorbidity present        Encounter for screening mammogram for breast cancer        Screening for diabetes mellitus        Screening cholesterol level          Care Instructions    Total calcium intake of 1500 mgm/day, vitamin D 400-800IU/day and a regular weight bearing exercise program for prevention of osteoporosis is recommended treatment at this time.    1)  Medication: continue current medication regimen unchanged  2)  Dietary sodium restriction  3)  Regular aerobic exercise and weight loss  4)  Recheck in 1 year, sooner should new symptoms or   problems arise.    Patient Education: Reviewed risks of hypertension and principles of   Treatment.    Rehab stretches/ exercises to begin immediately and given in writing with illustrations.  Monitor knee                Follow-ups after your visit        Additional Services     RADIOLOGY REFERRAL       Your provider [facility] referred you to:  [facility]    Please be aware that coverage of these services is subject to the terms and limitations of your health insurance plan.  Call member services at your health plan with any benefit or coverage questions.      Please bring the following to your appointment:    >>   Any  x-rays, CTs or MRIs which have been performed.  Contact the facility where they were done to arrange for  prior to your scheduled appointment.    >>   List of current medications   >>   This referral request   >>   Any documents/labs given to you for this referral    Prior authorization is required for MRI/MRA, CT, Dexa Scans and Worker's Compensation cases.                  Future tests that were ordered for you today     Open Future Orders        Priority Expected Expires Ordered    Mammo Screening digital (bilat) Routine  10/30/2018 10/30/2017            Who to contact     If you have questions or need follow up information about today's clinic visit or your schedule please contact [facility] directly at [phone].  Normal or non-critical lab and imaging results will be communicated to you by ParkerVisionhart, letter or phone within 4 business days after the clinic has received the results. If you do not hear from us within 7 days, please contact the clinic through Kuonat or phone. If you have a critical or abnormal lab result, we will notify you by phone as soon as possible.  Submit refill requests through CrystalCommerce or call your pharmacy and they will forward the refill request to us. Please allow 3 business days for your refill to be completed.          Additional Information About Your Visit        ParkerVisionharPresenterNet Information     CrystalCommerce gives you secure access to your electronic health record. If you see a primary care provider, you can also send messages to your care team and make appointments. If you have questions, please call your primary care clinic.  If you do not have a primary care provider, please call [phone] and they will assist you.        Care EveryWhere ID     This is your Care EveryWhere ID. This could be used by other organizations to access your [place] medical records  [id number]        Your Vitals Were     Pulse Temperature Height Last Period Pulse Oximetry BMI (Body  "Mass Index)    74 98.2  F (36.8  C) (Oral) 1.657 m (5' 5.24\") 10/09/2017 98% 31.06 kg/m2       Blood Pressure from Last 3 Encounters:   10/30/17 120/80   07/31/17 (!) 132/92   07/10/17 (!) 148/94    Weight from Last 3 Encounters:   10/30/17 85.3 kg (188 lb)   07/31/17 86.1 kg (189 lb 12.8 oz)   07/10/17 86.4 kg (190 lb 6.4 oz)              We Performed the Following     AUTO HEMOGRAM/PLATE/DIFF [07083.000]     COMPREHENSIVE METABOLIC PANEL     LIPID PANEL     OFFICE/OUTPT VISIT,EST,LEVL III     RADIOLOGY REFERRAL     ThinPrep Pap and HPV (mRNA E6/E7){HPV-REFLEX} ([facility])     VENIPUNC FNGR,HEEL,EAR [31873]          Today's Medication Changes          These changes are accurate as of: 10/30/17  9:33 AM.  If you have any questions, ask your nurse or doctor.               These medicines have changed or have updated prescriptions.        Dose/Directions    fluticasone 50 MCG/ACT spray   Commonly known as:  FLONASE   This may have changed:  See the new instructions.   Used for:  Chronic seasonal allergic rhinitis due to pollen   Changed by:  Zoe Chatterjee MD        Dose:  2 spray   Spray 2 sprays into both nostrils daily   Quantity:  3 Bottle   Refills:  3       losartan 50 MG tablet   Commonly known as:  COZAAR   This may have changed:    - how much to take  - how to take this  - when to take this  - additional instructions   Used for:  Essential hypertension, benign   Changed by:  Zoe Chatterjee MD        Dose:  25 mg   Take 0.5 tablets (25 mg) by mouth daily Taking 1/2 tab   Quantity:  45 tablet   Refills:  3            Where to get your medicines      These medications were sent to [id number] - [place] - [address] AT [facility]  [address] [address]     Phone:  [phone]     fexofenadine 60 MG tablet    FLUoxetine 20 MG capsule    fluticasone 50 MCG/ACT spray    losartan 50 MG tablet    SUMAtriptan 100 MG tablet                Primary Care Provider Office Phone " # Fax #    Zoe Chatterjee [phone] [address] [facility]  [address]  [facility] [address]        Equal Access to Services     BALTAZAR ROLAND : Hadshahid yahir sanchez pamelao Sodebbie, waaxda luqadaha, qaybta kaalmada ellen, anh raymond laStephanievaishali emanuel. So [facility] [phone].    ATENCIÓN: Si habla español, tiene a abdul disposición servicios gratuitos de asistencia lingüística. Llame al [phone].    We comply with applicable federal civil rights laws and [place] laws. We do not discriminate on the basis of race, color, national origin, age, disability, sex, sexual orientation, or gender identity.            Thank you!     Thank you for choosing [facility]  for your care. Our goal is always to provide you with excellent care. Hearing back from our patients is one way we can continue to improve our services. Please take a few minutes to complete the written survey that you may receive in the mail after your visit with us. Thank you!             Your Updated Medication List - Protect others around you: Learn how to safely use, store and throw away your medicines at www.disposemymeds.org.          This list is accurate as of: 10/30/17  9:33 AM.  Always use your most recent med list.                   Brand Name Dispense Instructions for use Diagnosis    fexofenadine 60 MG tablet    ALLEGRA    180 tablet    Take 1 tablet (60 mg) by mouth 2 times daily    Chronic seasonal allergic rhinitis due to pollen       FLUoxetine 20 MG capsule    PROZAC    90 capsule    Take 1 capsule (20 mg) by mouth daily    Migraine with aura and without status migrainosus, not intractable, Major depressive disorder, recurrent episode, in full remission (H)       fluticasone 50 MCG/ACT spray    FLONASE    3 Bottle    Spray 2 sprays into both nostrils daily    Chronic seasonal allergic rhinitis due to pollen       losartan 50 MG tablet    COZAAR    45 tablet    Take 0.5 tablets (25 mg) by  mouth daily Taking 1/2 tab    Essential hypertension, benign       SUMAtriptan 100 MG tablet    IMITREX    9 tablet    Take 1 tablet (100 mg) by mouth at onset of headache for migraine May repeat in 2 hours. Max 2 tablets/24 hours.    Migraine with aura and without status migrainosus, not intractable

## 2017-10-30 NOTE — NURSING NOTE
Patient is here for a full physical exam and pap.  Pre-Visit Screening :  Immunizations : up to date    Colonoscopy : is up to date  Mammogram : is due and to be scheduled by patient for later completion  Asthma Action Plan/Test : na  PHQ9/GAD7 : GAD7  Pulse - regular  Medication Reconciliation: complete    BP done on the right arm, with a lg sized cuff.  Pulse - regular  My Chart - accepts    CLASSIFICATION OF OVERWEIGHT AND OBESITY BY BMI                         Obesity Class           BMI(kg/m2)  Underweight                                    < 18.5  Normal                                         18.5-24.9  Overweight                                     25.0-29.9  OBESITY                     I                  30.0-34.9                              II                 35.0-39.9  EXTREME OBESITY             III                >40                             Patient's  BMI Body mass index is 31.06 kg/(m^2).  http://hin.nhlbi.nih.gov/menuplanner/menu.cgi  Questioned patient about current smoking habits.  Pt. has never smoked.  ETOH screening:  Questions:  1-How often do you have a drink containing alcohol?                             1 times per week(s)  2-How many drinks containing alcohol do you have on a typical day when you are         Drinking?                              0  3- How often do you have 5 or more drinks on one occasion?                               per never    Have you ever:  None of the patient's responses to the CAGE screening were positive / Negative CAGE score

## 2017-10-30 NOTE — PROGRESS NOTES
1.  SUBJECTIVE:  Lori M Seavers is an 51 year old G 1 P 1 woman who presents for   annual gyn exam, depression and hypertension revelaution and refill of medications. She also struggles with migraines much improved and allergies year around. See three separate notes     Patient's last menstrual period was 10/09/2017. Periods are irregular, lasting   5 days. Dysmenorrhea:none. Cyclic symptoms   include none. No intermenstrual bleeding,   spotting, or discharge.    Current contraception: none  JAZZ exposure: no  History of abnormal Pap smear: No  Family history of uterine or ovarian cancer: No  Regular self breast exam: Yes  History of abnormal mammogram: No  Family history of breast cancer: No  History of abnormal lipids: No    Past Medical History:   Diagnosis Date     Allergic rhinitis due to pollen      Chronic depressive personality disorder      Essential hypertension, benign 6/27/2017     Migraine 5/4/2009     Problem list name updated by automated process. Provider to review     Obesity 5/4/2009     Problem list name updated by automated process. Provider to review       Family History   Problem Relation Age of Onset     Allergies Brother      Hypertension Brother      DIABETES Father      insulin daily, age 52     Hypertension Father      Dementia Father      Hypertension Brother      Hypertension Mother      Hypertension Maternal Grandmother      Coronary Artery Disease Maternal Grandfather 58     MI     Hypertension Maternal Grandfather      Other - See Comments Paternal Grandmother      MVA     Coronary Artery Disease Paternal Grandfather      MI in his 70s.      Coronary Artery Disease Paternal Aunt 78       Past Surgical History:   Procedure Laterality Date     HC REDUCTION OF LARGE BREAST  6/03    bilateral       Current Outpatient Prescriptions   Medication     FLUoxetine (PROZAC) 20 MG capsule     losartan (COZAAR) 50 MG tablet     SUMAtriptan (IMITREX) 100 MG tablet     fluticasone (FLONASE) 50  "MCG/ACT spray     fexofenadine (ALLEGRA) 60 MG tablet     No current facility-administered medications for this visit.      Allergies   Allergen Reactions     Amoxicillin      hives     Lisinopril      Sulfamethoxazole W/Trimethoprim      hives       Social History   Substance Use Topics     Smoking status: Never Smoker     Smokeless tobacco: Never Used     Alcohol use 0.5 oz/week     1 Standard drinks or equivalent per week       Review Of Systems  Ears/Nose/Throat: allergies year around  Respiratory: No shortness of breath, dyspnea on exertion, cough, or hemoptysis  Cardiovascular: hypertension well controlled  Gastrointestinal: negative/ colonoscopy WNL  Genitourinary: negative  Mental health: anxiety/depression well controlled/ see second note  Ext: left knee makes a snapping sound on extension/ sometimes the right. No pain, past injury ,swelling, locking  CNS; migraines well controlled with menopause and not getting hungry/ dehydrated      OBJECTIVE:  /80 (BP Location: Right arm, Cuff Size: Adult Large)  Pulse 74  Temp 98.2  F (36.8  C) (Oral)  Ht 1.657 m (5' 5.24\")  Wt 85.3 kg (188 lb)  LMP 10/09/2017  SpO2 98%  BMI 31.06 kg/m2  General appearance: healthy, alert, no distress, cooperative, smiling and over weight  Skin: Skin color, texture, turgor normal. No rashes or lesions.  Ears: negative  Nose/Sinuses: Nares normal. Septum midline. Mucosa normal. No drainage or sinus tenderness.  Oropharynx: Lips, mucosa, and tongue normal. Teeth and gums normal.  Neck: Neck supple. No adenopathy. Thyroid symmetric, normal size,, Carotids without bruits.  Lungs: negative, Percussion normal. Good diaphragmatic excursion. Lungs clear  Heart: negative, PMI normal. No lifts, heaves, or thrills. RRR. No murmurs, clicks gallops or rub  Breasts: Inspection negative. No nipple discharge or bleeding. No masses.  Abdomen: Abdomen soft, non-tender. BS normal. No masses, organomegaly  Pelvic: External genitalia and " vagina normal. Bimanual and rectovaginal normal.  BMI : Body mass index is 31.06 kg/(m^2).    ASSESSMENT:  (Z01.411) Encounter for gynecological examination with abnormal finding  (primary encounter diagnosis)  Plan: ThinPrep Pap and HPV (mRNA E6/E7)         ([facility]), VENIPUNC FNGR,HEEL,EAR [[id number]], AUTO         HEMOGRAM/PLATE/DIFF [46546.000]        Total calcium intake of 1500 mgm/day, vitamin D 400-800IU/day and a regular weight bearing exercise program for prevention of osteoporosis is recommended treatment at this time.      (I10) Essential hypertension, benign  Plan: losartan (COZAAR) 50 MG tablet, VENIPUNC         FNGR,HEEL,EAR [[id number]], COMPREHENSIVE METABOLIC         PANEL        1)  Medication: continue current medication regimen unchanged  2)  Dietary sodium restriction  3)  Regular aerobic exercise  4)  Recheck in 1 year, sooner should new symptoms or   problems arise.    Patient Education: Reviewed risks of hypertension and principles of   treatment.        (J30.1) Chronic seasonal allergic rhinitis due to pollen  Plan: fluticasone (FLONASE) 50 MCG/ACT spray,         fexofenadine (ALLEGRA) 60 MG tablet        refilled    (G43.109) Migraine with aura and without status migrainosus, not intractable  Plan: FLUoxetine (PROZAC) 20 MG capsule, SUMAtriptan         (IMITREX) 100 MG tablet        Monitor    (F33.42) Major depressive disorder, recurrent episode, in full remission (H)  Plan: FLUoxetine (PROZAC) 20 MG capsule        I've explained to her that drugs of the SSRI class can have side effects such as weight gain, sexual dysfunction, insomnia, headache, nausea. These medications are generally effective at alleviating symptoms of anxiety and/or depression. Let me know if significant side effects do occur.      (M22.42) Chondromalacia of patella, left  Comment: refilled      (E66.09) Class 1 obesity due to excess calories in adult, unspecified BMI, unspecified whether serious comorbidity present  Plan:  VENIPUNC FNGR,HEEL,EAR [[id number]], LIPID PANEL,         COMPREHENSIVE METABOLIC PANEL        A low fat diet, regular aerobic exercise like walking 30 minutes daily and weight control is the treatment recommendations at this time          (Z12.31) Encounter for screening mammogram for breast cancer  Plan: Mammo Screening digital (bilat), RADIOLOGY         REFERRAL            (Z13.1) Screening for diabetes mellitus  Plan: VENIPUNC FNGR,HEEL,EAR [[id number]], COMPREHENSIVE         METABOLIC PANEL            (Z13.220) Screening cholesterol level  Plan: VENIPUNC FNGR,HEEL,EAR [[id number]], LIPID PANEL              Dx:  1)  Pap smear  2)  Mammography, lipids at appropriate intervals      PE:  Reviewed health maintenance including diet, regular exercise   and periodic exams.    2.   SUBJECTIVE:  Lori M Seavers is an 51 year old female who presents for follow-up   of anxiety with milddepressive symptoms.  Initially evaluated 1990's with a divorce and and revisited 10/2014 with menopausal changes.  Notes from that visit reviewed.  Current symptoms include   irritablility. Symptoms that have subjectively improved include depressed mood, hopelessness, insomnia, psychomotor agitation (restless and /or feeling on edge), fatigue, feelings of worthlessness, difficulty with concentration, sleep disturbance, early morning awakening.  Previous and current treatment modalities   employed include individual therapy and medication(s) Prozac (fluoxetine).     Organic causes of depression present: menopause    Current Outpatient Prescriptions   Medication     losartan (COZAAR) 50 MG tablet     FLUoxetine (PROZAC) 20 MG capsule     SUMAtriptan (IMITREX) 100 MG tablet     fluticasone (FLONASE) 50 MCG/ACT spray     fexofenadine (ALLEGRA) 60 MG tablet     [DISCONTINUED] FLUoxetine (PROZAC) 20 MG capsule     [DISCONTINUED] losartan (COZAAR) 50 MG tablet     No current facility-administered medications for this visit.      Allergies   Allergen Reactions      "Amoxicillin      hives     Lisinopril      Sulfamethoxazole W/Trimethoprim      hives     Side effects of medication: none    Social History   Substance Use Topics     Smoking status: Never Smoker     Smokeless tobacco: Never Used     Alcohol use 0.5 oz/week     1 Standard drinks or equivalent per week         Neurologic: migraine headaches  Psychiatric: excessive stress-single. Family moved to [place]  Endocrine: negative and hot flashes    OBJECTIVE:  /80 (BP Location: Right arm, Cuff Size: Adult Large)  Pulse 74  Temp 98.2  F (36.8  C) (Oral)  Ht 1.657 m (5' 5.24\")  Wt 85.3 kg (188 lb)  LMP 10/09/2017  SpO2 98%  BMI 31.06 kg/m2  Mental Status Examination  Posture and motor behavior: negative  Dress, grooming, personal hygiene: negative  Facial expression: negative  Speech: negative  Mood: negative  Coherency and relevance of thought: negative  Thought content: negative  Perceptions: negative  Orientation: negative  Attention and concentration: negative  Memory: : negative  Information: negative  Vocabulary: negative  Abstract reasoning: negative  Judgment: negative    General appearance: healthy, alert, no distress, cooperative, smiling and over weight  Eyes: conjunctivae/corneas clear. PERRL, EOM's intact. Fundi benign  Ears: negative  Oropharynx: Lips, mucosa, and tongue normal. Teeth and gums normal.  Neck: Neck supple. No adenopathy. Thyroid symmetric, normal size,, Carotids without bruits.  Lungs: negative, Percussion normal. Good diaphragmatic excursion. Lungs clear  Heart: negative, PMI normal. No lifts, heaves, or thrills. RRR. No murmurs, clicks gallops or rub  Abdomen: Abdomen soft, non-tender. BS normal. No masses, organomegaly  Neuro: Gait normal. Reflexes normal and symmetric. Sensation grossly WNL.    3.     SUBJECTIVE:  Lori M Seavers is an 51 year old female who presents for evaluation of   hypertension. She indicates that she is feeling well and   denies any symptoms referable to her " "elevated blood pressure.   Specifically denies chest pain, palpitations, dyspnea, orthopnea,   PND or peripheral edema. Current medication regimen is as listed   below. Patient denies any side effects of medication.    Family history: positive for hypertension and cardiovascular disease  Age at onset of elevated blood pressure: 50  Cardiovascular risk factors: family history, obesity and stress  Use of agents associated with hypertension: none  History of renal disease: negative  History of flank trauma: negative    Current Outpatient Prescriptions   Medication     losartan (COZAAR) 50 MG tablet     FLUoxetine (PROZAC) 20 MG capsule     SUMAtriptan (IMITREX) 100 MG tablet     fluticasone (FLONASE) 50 MCG/ACT spray     fexofenadine (ALLEGRA) 60 MG tablet     [DISCONTINUED] FLUoxetine (PROZAC) 20 MG capsule     [DISCONTINUED] losartan (COZAAR) 50 MG tablet     No current facility-administered medications for this visit.      Allergies   Allergen Reactions     Amoxicillin      hives     Lisinopril      Sulfamethoxazole W/Trimethoprim      hives       Social History   Substance Use Topics     Smoking status: Never Smoker     Smokeless tobacco: Never Used     Alcohol use 0.5 oz/week     1 Standard drinks or equivalent per week       OBJECTIVE:  /80 (BP Location: Right arm, Cuff Size: Adult Large)  Pulse 74  Temp 98.2  F (36.8  C) (Oral)  Ht 1.657 m (5' 5.24\")  Wt 85.3 kg (188 lb)  LMP 10/09/2017  SpO2 98%  BMI 31.06 kg/m2  Repeat BP R arm seated = 120/80  with regular size cuff.  Fundi: deferred  Thyroid: normal to inspection and palpation  Lungs: negative, Percussion normal. Good diaphragmatic excursion. Lungs clear  Heart: negative, PMI normal. No lifts, heaves, or thrills. RRR. No murmurs, clicks gallops or rub  Peripheral pulses: radial=4/4, femoral=4/4, popliteal=4/4, dorsalis pedis=4/4,    "

## 2017-10-31 LAB
ALBUMIN SERPL-MCNC: 4.5 G/DL (ref 3.6–5.1)
ALBUMIN/GLOB SERPL: 1.6 (CALC) (ref 1–2.5)
ALP SERPL-CCNC: 69 U/L (ref 33–130)
ALT SERPL-CCNC: 41 U/L (ref 6–29)
AST SERPL-CCNC: 28 U/L (ref 10–35)
BILIRUB SERPL-MCNC: 0.5 MG/DL (ref 0.2–1.2)
BUN SERPL-MCNC: 18 MG/DL (ref 7–25)
BUN/CREATININE RATIO: ABNORMAL (CALC) (ref 6–22)
CALCIUM SERPL-MCNC: 9.4 MG/DL (ref 8.6–10.4)
CHLORIDE SERPLBLD-SCNC: 106 MMOL/L (ref 98–110)
CHOLEST SERPL-MCNC: 230 MG/DL
CHOLEST/HDLC SERPL: 4.4 (CALC)
CO2 SERPL-SCNC: 23 MMOL/L (ref 20–31)
CREAT SERPL-MCNC: 0.87 MG/DL (ref 0.5–1.05)
EGFR AFRICAN AMERICAN - QUEST: 90 ML/MIN/1.73M2
GFR SERPL CREATININE-BSD FRML MDRD: 78 ML/MIN/1.73M2
GLOBULIN, CALCULATED - QUEST: 2.9 G/DL (CALC) (ref 1.9–3.7)
GLUCOSE - QUEST: 103 MG/DL (ref 65–99)
HDLC SERPL-MCNC: 52 MG/DL
LDLC SERPL CALC-MCNC: 151 MG/DL (CALC)
NONHDLC SERPL-MCNC: 178 MG/DL (CALC)
POTASSIUM SERPL-SCNC: 3.9 MMOL/L (ref 3.5–5.3)
PROT SERPL-MCNC: 7.4 G/DL (ref 6.1–8.1)
SODIUM SERPL-SCNC: 138 MMOL/L (ref 135–146)
TRIGL SERPL-MCNC: 143 MG/DL

## 2017-10-31 ASSESSMENT — ANXIETY QUESTIONNAIRES: GAD7 TOTAL SCORE: 3

## 2017-11-03 LAB
CLINICAL HISTORY - QUEST: NORMAL
CYTOTECHNOLOGIST - QUEST: NORMAL
DESCRIPTIVE DIAGNOSIS - QUEST: NORMAL
LAST PAP DX - QUEST: NORMAL
LMP - QUEST: NORMAL
PREV BX DX - QUEST: NORMAL
REVIEW CYTOTECHNOLOGIST - QUEST: NORMAL
SOURCE: NORMAL
STATEMENT OF ADEQUACY - QUEST: NORMAL

## 2017-11-26 ENCOUNTER — MYC MEDICAL ADVICE (OUTPATIENT)
Dept: FAMILY MEDICINE | Facility: CLINIC | Age: 52
End: 2017-11-26

## 2017-11-27 NOTE — TELEPHONE ENCOUNTER
From: Lori M Seavers  To: Zoe Chatterjee MD  Sent: 11/26/2017 8:41 PM CST  Subject: Question about medications    Dr. Chatterjee,    Lately I've been thinking about stopping the Losartan medication. I took my blood pressure again tonight and it was 126/84. I guess I'd like to see if it goes up again if I stop taking Losartan. What do you think?    Thanks,  Mimi

## 2018-01-28 ENCOUNTER — MYC MEDICAL ADVICE (OUTPATIENT)
Dept: FAMILY MEDICINE | Facility: CLINIC | Age: 53
End: 2018-01-28

## 2018-01-28 DIAGNOSIS — F33.42 MAJOR DEPRESSIVE DISORDER, RECURRENT EPISODE, IN FULL REMISSION (H): ICD-10-CM

## 2018-01-28 DIAGNOSIS — G43.109 MIGRAINE WITH AURA AND WITHOUT STATUS MIGRAINOSUS, NOT INTRACTABLE: ICD-10-CM

## 2018-01-29 NOTE — TELEPHONE ENCOUNTER
Patient last seen 10/2017 for CPX    Pending Prescriptions:                       Disp   Refills    FLUoxetine (PROZAC) 20 MG capsule         90 cap*1            Sig: Take 1 capsule (20 mg) by mouth daily

## 2018-01-29 NOTE — TELEPHONE ENCOUNTER
From: Lori M Seavers  To: Zoe Chatterjee MD  Sent: 1/28/2018 5:59 PM CST  Subject: Question about medications    Hello Dr. Chatterjee,  It looks like I do not have any refills for fluoxetine 20mg. [facility] [place], or do I need to make an appointment?  Thank you,  Mimi

## 2018-03-26 ENCOUNTER — HOSPITAL ENCOUNTER (OUTPATIENT)
Dept: MAMMOGRAPHY | Facility: CLINIC | Age: 53
Discharge: HOME | End: 2018-03-26
Attending: FAMILY MEDICINE | Admitting: FAMILY MEDICINE
Payer: COMMERCIAL

## 2018-03-26 DIAGNOSIS — Z12.31 ENCOUNTER FOR SCREENING MAMMOGRAM FOR BREAST CANCER: ICD-10-CM

## 2018-03-26 PROCEDURE — 77067 SCR MAMMO BI INCL CAD: CPT

## 2018-06-22 ENCOUNTER — MYC MEDICAL ADVICE (OUTPATIENT)
Dept: FAMILY MEDICINE | Facility: CLINIC | Age: 53
End: 2018-06-22

## 2018-06-22 NOTE — TELEPHONE ENCOUNTER
From: Lori M Seavers  To: Zoe Chatterjee MD  Sent: 6/22/2018 11:24 AM CDT  Subject: Question about medications    Dr. Chatterjee,  I've had a persistent headache since Monday above my left eye/temple region. I've taken Tylenol and that hasn't helped. I also tried sumatriptan at some point thinking it could be a migraine without aura (mine so far have always had an aura). I think ibuprofen is not an option for me since I take fluoxetine. Do you have another suggestion?  Thank you,  Mimi

## 2018-07-13 ENCOUNTER — TELEPHONE (OUTPATIENT)
Dept: FAMILY MEDICINE | Facility: CLINIC | Age: 53
End: 2018-07-13

## 2018-09-12 DIAGNOSIS — G43.109 MIGRAINE WITH AURA AND WITHOUT STATUS MIGRAINOSUS, NOT INTRACTABLE: ICD-10-CM

## 2018-09-12 RX ORDER — SUMATRIPTAN 100 MG/1
100 TABLET, FILM COATED ORAL
Qty: 6 TABLET | Refills: 0 | Status: SHIPPED | OUTPATIENT
Start: 2018-09-12

## 2018-09-12 NOTE — TELEPHONE ENCOUNTER
Lori M Seavers is requesting a refill of the following medication:    Pending Prescriptions:                       Disp   Refills    SUMAtriptan (IMITREX) 100 MG tablet       6 tabl*0            Sig: Take 1 tablet (100 mg) by mouth at onset of           headache for migraine May repeat in 2 hours. Max           2 tablets/24 hours.    Last Refill: 10/30/2017  Last OV: 10/30/2017  Recheck: 1 year  Scheduled Office Visit: None Scheduled    Please Close Encounter If Approved.    Thank You,  Elsie Broderick, CMA

## 2019-12-08 ENCOUNTER — HEALTH MAINTENANCE LETTER (OUTPATIENT)
Age: 54
End: 2019-12-08

## 2021-01-09 ENCOUNTER — HEALTH MAINTENANCE LETTER (OUTPATIENT)
Age: 56
End: 2021-01-09

## 2021-10-23 ENCOUNTER — HEALTH MAINTENANCE LETTER (OUTPATIENT)
Age: 56
End: 2021-10-23

## 2022-02-12 ENCOUNTER — HEALTH MAINTENANCE LETTER (OUTPATIENT)
Age: 57
End: 2022-02-12

## 2022-10-09 ENCOUNTER — HEALTH MAINTENANCE LETTER (OUTPATIENT)
Age: 57
End: 2022-10-09

## 2023-03-25 ENCOUNTER — HEALTH MAINTENANCE LETTER (OUTPATIENT)
Age: 58
End: 2023-03-25